# Patient Record
Sex: MALE | Employment: OTHER | URBAN - METROPOLITAN AREA
[De-identification: names, ages, dates, MRNs, and addresses within clinical notes are randomized per-mention and may not be internally consistent; named-entity substitution may affect disease eponyms.]

---

## 2021-12-30 ENCOUNTER — TRANSFERRED RECORDS (OUTPATIENT)
Dept: HEALTH INFORMATION MANAGEMENT | Facility: CLINIC | Age: 61
End: 2021-12-30

## 2021-12-30 ENCOUNTER — HOSPITAL ENCOUNTER (OUTPATIENT)
Dept: MRI IMAGING | Facility: OTHER | Age: 61
End: 2021-12-30
Attending: ORTHOPAEDIC SURGERY
Payer: MEDICARE

## 2021-12-30 DIAGNOSIS — Z98.890 S/P BONE GRAFT: ICD-10-CM

## 2021-12-30 DIAGNOSIS — M77.9 BONE SPUR: ICD-10-CM

## 2021-12-30 PROCEDURE — 72195 MRI PELVIS W/O DYE: CPT

## 2021-12-30 PROCEDURE — 73221 MRI JOINT UPR EXTREM W/O DYE: CPT | Mod: LT

## 2022-01-03 ENCOUNTER — TRANSFERRED RECORDS (OUTPATIENT)
Dept: HEALTH INFORMATION MANAGEMENT | Facility: CLINIC | Age: 62
End: 2022-01-03
Payer: COMMERCIAL

## 2022-01-12 ENCOUNTER — TRANSCRIBE ORDERS (OUTPATIENT)
Dept: OTHER | Age: 62
End: 2022-01-12
Payer: COMMERCIAL

## 2022-01-12 DIAGNOSIS — D16.9 OSTEOCHONDROMA: Primary | ICD-10-CM

## 2022-01-14 NOTE — TELEPHONE ENCOUNTER
RECORDS RECEIVED FROM: Osteochondroma/ Referral from Maurice Condon M.D. with [facility] for osteochondroma.   Imaging: MRI/ [employer] and [employer]/ ortho con-Patient would like link to go to [email or web address] for video visit please   DATE RECEIVED: Jan 18, 2022     NOTES STATUS DETAILS   OFFICE NOTE from referring provider Received  Maurice Condon MD          MEDICATION LIST Internal    MRI Received 12/30/21   CT SCAN In process-R    XRAYS (IMAGES & REPORTS) In process-R      01/17/22   9:50 AM   STAT REQUESTS SENT TO [facility] FOR IMAGES  Bette Puckett CMA

## 2022-01-18 ENCOUNTER — VIRTUAL VISIT (OUTPATIENT)
Dept: ORTHOPEDICS | Facility: CLINIC | Age: 62
End: 2022-01-18
Payer: COMMERCIAL

## 2022-01-18 ENCOUNTER — PRE VISIT (OUTPATIENT)
Dept: ORTHOPEDICS | Facility: CLINIC | Age: 62
End: 2022-01-18
Payer: COMMERCIAL

## 2022-01-18 ENCOUNTER — TELEPHONE (OUTPATIENT)
Dept: ORTHOPEDICS | Facility: CLINIC | Age: 62
End: 2022-01-18

## 2022-01-18 DIAGNOSIS — D16.9 OSTEOCHONDROMA: ICD-10-CM

## 2022-01-18 PROCEDURE — 99202 OFFICE O/P NEW SF 15 MIN: CPT | Mod: 95 | Performed by: ORTHOPAEDIC SURGERY

## 2022-01-18 RX ORDER — VITAMIN B COMPLEX
1 CAPSULE ORAL
COMMUNITY

## 2022-01-18 RX ORDER — ACETAMINOPHEN 325 MG/1
650 TABLET ORAL
COMMUNITY

## 2022-01-18 RX ORDER — CYCLOBENZAPRINE HCL 10 MG
10 TABLET ORAL 3 TIMES DAILY PRN
COMMUNITY

## 2022-01-18 RX ORDER — ACYCLOVIR 400 MG/1
400 TABLET ORAL
COMMUNITY
Start: 2021-11-01

## 2022-01-18 RX ORDER — MULTIPLE VITAMINS W/ MINERALS TAB 9MG-400MCG
1 TAB ORAL
COMMUNITY

## 2022-01-18 RX ORDER — IBUPROFEN 200 MG
400 TABLET ORAL
COMMUNITY

## 2022-01-18 RX ORDER — DESOXIMETASONE 2.5 MG/G
CREAM TOPICAL
COMMUNITY
Start: 2021-11-01

## 2022-01-18 NOTE — NURSING NOTE
Chief Complaint   Patient presents with     Consult     osteochondroma anterior superior right iliac crest referred by Dr. Maurice Condon      Video Visit     video via [employer]        61 year old  1960        Date/Surgery/Surgeon/Hospital:  1. Right hip bone harvest to left wrist in 1984 per pt          Pain Assessment  Patient Currently in Pain: Yes  0-10 Pain Scale: 6  Primary Pain Location: Hip (right hip area)            [id number] - [place], [place] - [address] [facility] [address]        Allergies   Allergen Reactions     Shellfish-Derived Products Anaphylaxis     Shrimp Anaphylaxis     Meloxicam Other (See Comments)     Other reaction(s): Edema  Swelling feet  Swelling feet  Swelling feet       Other Food Allergy      Crabs and lobsters      Dog Epithelium      Other reaction(s): Other (see comments)           Current Outpatient Medications   Medication     acetaminophen (TYLENOL) 325 MG tablet     acyclovir (ZOVIRAX) 400 MG tablet     Ascorbic Acid 250 MG CHEW     Cholecalciferol 10 MCG (400 UNIT) CAPS     cyclobenzaprine (FLEXERIL) 10 MG tablet     desoximetasone (TOPICORT) 0.25 % external cream     ibuprofen (ADVIL/MOTRIN) 200 MG tablet     multivitamin w/minerals (MULTI-VITAMIN) tablet     vitamin (B COMPLEX) capsule     Zinc 50 MG CAPS     No current facility-administered medications for this visit.

## 2022-01-18 NOTE — PROGRESS NOTES
Patient is a 61-year-old male who was seen today by video visit.    His chief complaint is a bony mass in the right pelvis.    He reports history dating back to around 1985 when he had bone graft harvested from his right iliac crest.  It appears as if he subsequently formed a bone  reaction to the graft harvest site and this has been a site of discomfort for him since 1997.  He reports the bony mass is causing a reduction in his mobility is causing pain is causing difficulties using a belt.  He describes a reduction in hip motion which he associates with the bony prominence.    I reviewed with neck that given the small size of the bony prominence I suspect it is causing some symptoms.  I cautioned him however that might not be causing all of his symptoms.  He understands this and would still like to consider removal of the bony prominence in the right ilium.    I reviewed his previous CT of the pelvis and MRIs of the pelvis and the left hip.  In summary these do show a bony prominence on the outer table of the ilium at the level of the anterior superior iliac spine.  This is narrow and sharp and certainly could represent a source of significant symptoms.    I reviewed with him the options of observation versus surgical excision.  I mentioned that surgical excision I think would help his symptoms but would likely not eliminate all of his symptoms.  I reviewed the risk of infection bony prominence recurrence, anesthetics to mention a few.  He understands these and would like to proceed with surgery.    Impression: Patient with a symptomatic bony prominence coming off of the anterior superior iliac spine region.    Plan: 1.  Surgical request form is completed.  2.  Jocelyn and Jose to follow-up with the patient to arrange surgery which will most likely need to be performed at the [facility] because of his BMI.    During this video visit in preparation I reviewed his imaging studies from 1:03 PM to 1:08 PM.  The  phone interaction and documentation was from 1:08 PM to 1:26 PM.  Total visit therefore was 23 minutes.

## 2022-01-18 NOTE — LETTER
1/18/2022         RE: Hebert Phelps  [address]  [place] [address]        Dear Colleague,    Thank you for referring your patient, Hebert Phelps, to the [facility] [place]. Please see a copy of my visit note below.    Patient is a 61-year-old male who was seen today by video visit.    His chief complaint is a bony mass in the right pelvis.    He reports history dating back to around 2014 when he had bone graft harvested from his right iliac crest.  It appears as if he subsequently formed a bone marrow reaction to the skin graft and this has been a site of discomfort for him since 2008.  He reports the bony mass is causing a reduction in his mobility is causing pain is causing difficulties using a belt.  He describes a reduction in hip motion which he associates with the bony prominence.    I reviewed with neck that given the small size of the bony prominence I suspect it is causing some symptoms.  I cautioned him however that might not be causing all of his symptoms.  He understands this and would still like to consider removal of the bony prominence in the right ilium.    I reviewed his previous CT of the pelvis and MRIs of the pelvis and the left hip.  In summary these do show a bony prominence on the outer table of the ilium at the level of the anterior superior iliac spine.  This is narrow and sharp and certainly could represent a source of significant symptoms.    I reviewed with him the options of observation versus surgical excision.  I mentioned that surgical excision I think would help his symptoms but would likely not eliminate all of his symptoms.  I reviewed the risk of infection bony prominence recurrence, anesthetics to mention a few.  He understands these and would like to proceed with surgery.    Impression: Patient with a symptomatic bony prominence coming off of the anterior superior iliac spine region.    Plan: 1.  Surgical request form is  completed.  2.  Jocelyn and Jose to follow-up with the patient to arrange surgery which will most likely need to be performed at the [facility] because of his BMI.    During this video visit in preparation I reviewed his imaging studies from 1:03 PM to 1:08 PM.  The phone interaction and documentation was from 1:08 PM to 1:26 PM.  Total visit therefore was 23 minutes.      Jorge L Sewell MD

## 2022-01-19 ENCOUNTER — TELEPHONE (OUTPATIENT)
Dept: ORTHOPEDICS | Facility: CLINIC | Age: 62
End: 2022-01-19
Payer: COMMERCIAL

## 2022-01-19 DIAGNOSIS — Z11.59 ENCOUNTER FOR SCREENING FOR OTHER VIRAL DISEASES: Primary | ICD-10-CM

## 2022-01-19 NOTE — TELEPHONE ENCOUNTER
RN called and reviewed surgery packet with patient.  RN also took down some corrections to dates that the patent would like changed in his notes from yesterday.  Patient will have Hieu drive him home from surgery and he will stay at his sister's home following surgery.  He is encouraged to review surgey packet and call us back with any questions that he may have.    He will have his H&P on  01-27-22 with PCP.  He has no other questions at this time.

## 2022-01-19 NOTE — TELEPHONE ENCOUNTER
Patient is scheduled for surgery with Dr. Sewell    Spoke with: Hebert    Date of Surgery: 1/31/2022    Location: [place]    Informed patient they will need an adult  yes    Pre op with Provider n/a    H&P: Scheduled with pcp    Pre-procedure COVID-19 Test: patient will do this locally    Additional imaging/appointments: n/a    Surgery packet: Mailed by RN     Additional comments: n/a

## 2022-01-30 ENCOUNTER — ANESTHESIA EVENT (OUTPATIENT)
Dept: SURGERY | Facility: CLINIC | Age: 62
End: 2022-01-30
Payer: COMMERCIAL

## 2022-01-30 ASSESSMENT — LIFESTYLE VARIABLES: TOBACCO_USE: 1

## 2022-01-31 ENCOUNTER — HOSPITAL ENCOUNTER (OUTPATIENT)
Facility: CLINIC | Age: 62
Discharge: HOME | End: 2022-01-31
Attending: ORTHOPAEDIC SURGERY | Admitting: ORTHOPAEDIC SURGERY
Payer: COMMERCIAL

## 2022-01-31 ENCOUNTER — ANESTHESIA (OUTPATIENT)
Dept: SURGERY | Facility: CLINIC | Age: 62
End: 2022-01-31
Payer: COMMERCIAL

## 2022-01-31 VITALS
TEMPERATURE: 98 F | RESPIRATION RATE: 12 BRPM | HEIGHT: 73 IN | HEART RATE: 62 BPM | DIASTOLIC BLOOD PRESSURE: 74 MMHG | SYSTOLIC BLOOD PRESSURE: 129 MMHG | BODY MASS INDEX: 40.91 KG/M2 | OXYGEN SATURATION: 95 % | WEIGHT: 308.64 LBS

## 2022-01-31 DIAGNOSIS — R22.41 HIP MASS, RIGHT: Primary | ICD-10-CM

## 2022-01-31 LAB — GLUCOSE BLDC GLUCOMTR-MCNC: 100 MG/DL (ref 70–99)

## 2022-01-31 PROCEDURE — 250N000025 HC SEVOFLURANE, PER MIN: Performed by: ORTHOPAEDIC SURGERY

## 2022-01-31 PROCEDURE — 710N000012 HC RECOVERY PHASE 2, PER MINUTE: Performed by: ORTHOPAEDIC SURGERY

## 2022-01-31 PROCEDURE — 88311 DECALCIFY TISSUE: CPT | Mod: TC | Performed by: ORTHOPAEDIC SURGERY

## 2022-01-31 PROCEDURE — 250N000011 HC RX IP 250 OP 636: Performed by: STUDENT IN AN ORGANIZED HEALTH CARE EDUCATION/TRAINING PROGRAM

## 2022-01-31 PROCEDURE — 250N000013 HC RX MED GY IP 250 OP 250 PS 637: Performed by: STUDENT IN AN ORGANIZED HEALTH CARE EDUCATION/TRAINING PROGRAM

## 2022-01-31 PROCEDURE — 250N000009 HC RX 250: Performed by: STUDENT IN AN ORGANIZED HEALTH CARE EDUCATION/TRAINING PROGRAM

## 2022-01-31 PROCEDURE — 999N000141 HC STATISTIC PRE-PROCEDURE NURSING ASSESSMENT: Performed by: ORTHOPAEDIC SURGERY

## 2022-01-31 PROCEDURE — 250N000011 HC RX IP 250 OP 636: Performed by: ORTHOPAEDIC SURGERY

## 2022-01-31 PROCEDURE — 370N000017 HC ANESTHESIA TECHNICAL FEE, PER MIN: Performed by: ORTHOPAEDIC SURGERY

## 2022-01-31 PROCEDURE — 360N000075 HC SURGERY LEVEL 2, PER MIN: Performed by: ORTHOPAEDIC SURGERY

## 2022-01-31 PROCEDURE — 272N000001 HC OR GENERAL SUPPLY STERILE: Performed by: ORTHOPAEDIC SURGERY

## 2022-01-31 PROCEDURE — 710N000010 HC RECOVERY PHASE 1, LEVEL 2, PER MIN: Performed by: ORTHOPAEDIC SURGERY

## 2022-01-31 PROCEDURE — 82962 GLUCOSE BLOOD TEST: CPT

## 2022-01-31 PROCEDURE — 258N000003 HC RX IP 258 OP 636: Performed by: STUDENT IN AN ORGANIZED HEALTH CARE EDUCATION/TRAINING PROGRAM

## 2022-01-31 PROCEDURE — 88305 TISSUE EXAM BY PATHOLOGIST: CPT | Mod: TC | Performed by: ORTHOPAEDIC SURGERY

## 2022-01-31 PROCEDURE — 27066 REMOVE HIP BONE LES DEEP: CPT | Mod: RT | Performed by: ORTHOPAEDIC SURGERY

## 2022-01-31 RX ORDER — SODIUM CHLORIDE, SODIUM LACTATE, POTASSIUM CHLORIDE, CALCIUM CHLORIDE 600; 310; 30; 20 MG/100ML; MG/100ML; MG/100ML; MG/100ML
INJECTION, SOLUTION INTRAVENOUS CONTINUOUS
Status: DISCONTINUED | OUTPATIENT
Start: 2022-01-31 | End: 2022-01-31 | Stop reason: HOSPADM

## 2022-01-31 RX ORDER — ONDANSETRON 2 MG/ML
INJECTION INTRAMUSCULAR; INTRAVENOUS PRN
Status: DISCONTINUED | OUTPATIENT
Start: 2022-01-31 | End: 2022-01-31

## 2022-01-31 RX ORDER — HALOPERIDOL 5 MG/ML
1 INJECTION INTRAMUSCULAR
Status: DISCONTINUED | OUTPATIENT
Start: 2022-01-31 | End: 2022-01-31 | Stop reason: HOSPADM

## 2022-01-31 RX ORDER — HYDROMORPHONE HCL IN WATER/PF 6 MG/30 ML
0.2 PATIENT CONTROLLED ANALGESIA SYRINGE INTRAVENOUS EVERY 5 MIN PRN
Status: DISCONTINUED | OUTPATIENT
Start: 2022-01-31 | End: 2022-01-31 | Stop reason: HOSPADM

## 2022-01-31 RX ORDER — OXYCODONE HYDROCHLORIDE 5 MG/1
5-10 TABLET ORAL EVERY 4 HOURS PRN
Qty: 20 TABLET | Refills: 0 | Status: SHIPPED | OUTPATIENT
Start: 2022-01-31 | End: 2022-02-03

## 2022-01-31 RX ORDER — ONDANSETRON 2 MG/ML
4 INJECTION INTRAMUSCULAR; INTRAVENOUS EVERY 30 MIN PRN
Status: DISCONTINUED | OUTPATIENT
Start: 2022-01-31 | End: 2022-01-31 | Stop reason: HOSPADM

## 2022-01-31 RX ORDER — FENTANYL CITRATE 50 UG/ML
25 INJECTION, SOLUTION INTRAMUSCULAR; INTRAVENOUS EVERY 5 MIN PRN
Status: DISCONTINUED | OUTPATIENT
Start: 2022-01-31 | End: 2022-01-31 | Stop reason: HOSPADM

## 2022-01-31 RX ORDER — SODIUM CHLORIDE, SODIUM LACTATE, POTASSIUM CHLORIDE, CALCIUM CHLORIDE 600; 310; 30; 20 MG/100ML; MG/100ML; MG/100ML; MG/100ML
INJECTION, SOLUTION INTRAVENOUS CONTINUOUS PRN
Status: DISCONTINUED | OUTPATIENT
Start: 2022-01-31 | End: 2022-01-31

## 2022-01-31 RX ORDER — CEFAZOLIN SODIUM IN 0.9 % NACL 3 G/100 ML
3 INTRAVENOUS SOLUTION, PIGGYBACK (ML) INTRAVENOUS
Status: COMPLETED | OUTPATIENT
Start: 2022-01-31 | End: 2022-01-31

## 2022-01-31 RX ORDER — FENTANYL CITRATE 50 UG/ML
25 INJECTION, SOLUTION INTRAMUSCULAR; INTRAVENOUS
Status: DISCONTINUED | OUTPATIENT
Start: 2022-01-31 | End: 2022-01-31 | Stop reason: HOSPADM

## 2022-01-31 RX ORDER — HYDRALAZINE HYDROCHLORIDE 20 MG/ML
2.5-5 INJECTION INTRAMUSCULAR; INTRAVENOUS EVERY 10 MIN PRN
Status: DISCONTINUED | OUTPATIENT
Start: 2022-01-31 | End: 2022-01-31 | Stop reason: HOSPADM

## 2022-01-31 RX ORDER — CEFAZOLIN SODIUM 2 G/100ML
2 INJECTION, SOLUTION INTRAVENOUS SEE ADMIN INSTRUCTIONS
Status: DISCONTINUED | OUTPATIENT
Start: 2022-01-31 | End: 2022-01-31 | Stop reason: HOSPADM

## 2022-01-31 RX ORDER — ACETAMINOPHEN 325 MG/1
975 TABLET ORAL ONCE
Status: COMPLETED | OUTPATIENT
Start: 2022-01-31 | End: 2022-01-31

## 2022-01-31 RX ORDER — CEFAZOLIN SODIUM 2 G/100ML
2 INJECTION, SOLUTION INTRAVENOUS
Status: DISCONTINUED | OUTPATIENT
Start: 2022-01-31 | End: 2022-01-31

## 2022-01-31 RX ORDER — ONDANSETRON 4 MG/1
4 TABLET, ORALLY DISINTEGRATING ORAL EVERY 30 MIN PRN
Status: DISCONTINUED | OUTPATIENT
Start: 2022-01-31 | End: 2022-01-31 | Stop reason: HOSPADM

## 2022-01-31 RX ORDER — FENTANYL CITRATE 50 UG/ML
INJECTION, SOLUTION INTRAMUSCULAR; INTRAVENOUS PRN
Status: DISCONTINUED | OUTPATIENT
Start: 2022-01-31 | End: 2022-01-31

## 2022-01-31 RX ORDER — OXYCODONE HYDROCHLORIDE 5 MG/1
5 TABLET ORAL EVERY 4 HOURS PRN
Status: DISCONTINUED | OUTPATIENT
Start: 2022-01-31 | End: 2022-01-31 | Stop reason: HOSPADM

## 2022-01-31 RX ORDER — LIDOCAINE 40 MG/G
CREAM TOPICAL
Status: DISCONTINUED | OUTPATIENT
Start: 2022-01-31 | End: 2022-01-31 | Stop reason: HOSPADM

## 2022-01-31 RX ORDER — DEXAMETHASONE SODIUM PHOSPHATE 4 MG/ML
INJECTION, SOLUTION INTRA-ARTICULAR; INTRALESIONAL; INTRAMUSCULAR; INTRAVENOUS; SOFT TISSUE PRN
Status: DISCONTINUED | OUTPATIENT
Start: 2022-01-31 | End: 2022-01-31

## 2022-01-31 RX ORDER — MEPERIDINE HYDROCHLORIDE 25 MG/ML
12.5 INJECTION INTRAMUSCULAR; INTRAVENOUS; SUBCUTANEOUS
Status: DISCONTINUED | OUTPATIENT
Start: 2022-01-31 | End: 2022-01-31 | Stop reason: HOSPADM

## 2022-01-31 RX ORDER — LIDOCAINE HYDROCHLORIDE 20 MG/ML
INJECTION, SOLUTION INFILTRATION; PERINEURAL PRN
Status: DISCONTINUED | OUTPATIENT
Start: 2022-01-31 | End: 2022-01-31

## 2022-01-31 RX ORDER — BUPIVACAINE HYDROCHLORIDE 2.5 MG/ML
INJECTION, SOLUTION INFILTRATION; PERINEURAL PRN
Status: DISCONTINUED | OUTPATIENT
Start: 2022-01-31 | End: 2022-01-31 | Stop reason: HOSPADM

## 2022-01-31 RX ORDER — PROPOFOL 10 MG/ML
INJECTION, EMULSION INTRAVENOUS PRN
Status: DISCONTINUED | OUTPATIENT
Start: 2022-01-31 | End: 2022-01-31

## 2022-01-31 RX ORDER — METOPROLOL TARTRATE 1 MG/ML
1-2 INJECTION, SOLUTION INTRAVENOUS EVERY 5 MIN PRN
Status: DISCONTINUED | OUTPATIENT
Start: 2022-01-31 | End: 2022-01-31 | Stop reason: HOSPADM

## 2022-01-31 RX ORDER — GABAPENTIN 300 MG/1
300 CAPSULE ORAL
Status: COMPLETED | OUTPATIENT
Start: 2022-01-31 | End: 2022-01-31

## 2022-01-31 RX ADMIN — LIDOCAINE HYDROCHLORIDE 100 MG: 20 INJECTION, SOLUTION INFILTRATION; PERINEURAL at 12:09

## 2022-01-31 RX ADMIN — DEXAMETHASONE SODIUM PHOSPHATE 4 MG: 4 INJECTION, SOLUTION INTRAMUSCULAR; INTRAVENOUS at 12:21

## 2022-01-31 RX ADMIN — FENTANYL CITRATE 50 MCG: 50 INJECTION, SOLUTION INTRAMUSCULAR; INTRAVENOUS at 12:09

## 2022-01-31 RX ADMIN — Medication 3 G: at 12:18

## 2022-01-31 RX ADMIN — GABAPENTIN 300 MG: 300 CAPSULE ORAL at 10:35

## 2022-01-31 RX ADMIN — ONDANSETRON 4 MG: 2 INJECTION INTRAMUSCULAR; INTRAVENOUS at 12:54

## 2022-01-31 RX ADMIN — OXYCODONE HYDROCHLORIDE 5 MG: 5 TABLET ORAL at 14:04

## 2022-01-31 RX ADMIN — FENTANYL CITRATE 50 MCG: 50 INJECTION, SOLUTION INTRAMUSCULAR; INTRAVENOUS at 12:30

## 2022-01-31 RX ADMIN — HYDROMORPHONE HYDROCHLORIDE 0.2 MG: 1 INJECTION, SOLUTION INTRAMUSCULAR; INTRAVENOUS; SUBCUTANEOUS at 13:46

## 2022-01-31 RX ADMIN — SODIUM CHLORIDE, POTASSIUM CHLORIDE, SODIUM LACTATE AND CALCIUM CHLORIDE: 600; 310; 30; 20 INJECTION, SOLUTION INTRAVENOUS at 12:04

## 2022-01-31 RX ADMIN — PROPOFOL 200 MG: 10 INJECTION, EMULSION INTRAVENOUS at 12:10

## 2022-01-31 RX ADMIN — ACETAMINOPHEN 975 MG: 325 TABLET, FILM COATED ORAL at 10:35

## 2022-01-31 RX ADMIN — ROCURONIUM BROMIDE 100 MG: 50 INJECTION, SOLUTION INTRAVENOUS at 12:13

## 2022-01-31 RX ADMIN — SUGAMMADEX 400 MG: 100 INJECTION, SOLUTION INTRAVENOUS at 13:09

## 2022-01-31 ASSESSMENT — MIFFLIN-ST. JEOR: SCORE: 2263.88

## 2022-01-31 NOTE — ANESTHESIA PREPROCEDURE EVALUATION
Anesthesia Pre-Procedure Evaluation    Patient: Hebert Phelps   MRN: [id number] : 1960        Preoperative Diagnosis: Osteochondroma [D16.9]    Procedure : Procedure(s):  Removal of right hip tumor          History reviewed. No pertinent past medical history.   Past Surgical History:   Procedure Laterality Date     COLONOSCOPY       ORTHOPEDIC SURGERY      Ligament repair right ankle, right hip      Allergies   Allergen Reactions     Shellfish-Derived Products Anaphylaxis     Shrimp Anaphylaxis     Meloxicam Other (See Comments)     Other reaction(s): Edema  Swelling feet  Swelling feet  Swelling feet       Other Food Allergy      Crabs and lobsters      Dog Epithelium      Other reaction(s): Other (see comments)      Social History     Tobacco Use     Smoking status: Never Smoker     Smokeless tobacco: Never Used   Substance Use Topics     Alcohol use: Not Currently      Wt Readings from Last 1 Encounters:   No data found for Wt        Anesthesia Evaluation   Pt has had prior anesthetic. Type: General.    No history of anesthetic complications       ROS/MED HX  ENT/Pulmonary:     (+) tobacco use, Past use,     Neurologic:  - neg neurologic ROS     Cardiovascular:     (+) Dyslipidemia -----    METS/Exercise Tolerance:     Hematologic:  - neg hematologic  ROS     Musculoskeletal: Comment: - Hx of neck and R shoulder pain  - Foraminal stenosis of cervical region    (+) arthritis,     GI/Hepatic:  - neg GI/hepatic ROS     Renal/Genitourinary:  - neg Renal ROS     Endo: Comment: - elevated glucose levels, A1c 6.1 (8/15/2018) - neg endo ROS     Psychiatric/Substance Use:  - neg psychiatric ROS     Infectious Disease:  - neg infectious disease ROS     Malignancy:       Other:               OUTSIDE LABS:  CBC: No results found for: WBC, HGB, HCT, PLT  BMP: No results found for: NA, POTASSIUM, CHLORIDE, CO2, BUN, CR, GLC  COAGS: No results found for: PTT, INR, FIBR  POC: No results found for: BGM, HCG,  HCGS  HEPATIC: No results found for: ALBUMIN, PROTTOTAL, ALT, AST, GGT, ALKPHOS, BILITOTAL, BILIDIRECT, TATIANA  OTHER: No results found for: PH, LACT, A1C, KIERAN, PHOS, MAG, LIPASE, AMYLASE, TSH, T4, T3, CRP, SED    Anesthesia Plan    ASA Status:  3      Anesthesia Type: General.     - Airway: ETT   Induction: Intravenous.   Maintenance: Inhalation.   Techniques and Equipment:     - Airway: Video-Laryngoscope     - Lines/Monitors: 2nd IV, BIS     Consents         - Patient is DNR/DNI Status: No         Postoperative Care    Pain management: Oral pain medications.   PONV prophylaxis: Ondansetron (or other 5HT-3), Dexamethasone or Solumedrol     Comments:                Nehemiah Bose MD

## 2022-01-31 NOTE — ANESTHESIA POSTPROCEDURE EVALUATION
Patient: Hebert Phelps    Procedure: Procedure(s):  Removal of right hip tumor       Diagnosis:Osteochondroma [D16.9]  Diagnosis Additional Information: No value filed.    Anesthesia Type:  General    Note:  Disposition: Outpatient   Postop Pain Control: Uneventful            Sign Out: Well controlled pain   PONV: No   Neuro/Psych: Uneventful            Sign Out: Acceptable/Baseline neuro status   Airway/Respiratory: Uneventful            Sign Out: Acceptable/Baseline resp. status   CV/Hemodynamics: Uneventful            Sign Out: Acceptable CV status   Other NRE: NONE   DID A NON-ROUTINE EVENT OCCUR? No    Event details/Postop Comments:  I personally evaluated the patient at bedside. No anesthesia-related complications noted. Patient is hemodynamically stable with adequate control of pain and nausea. Ready for discharge from PACU. All questions were answered.    Geovanny Xiao MD  Pediatric Staff Anesthesiologist  [facility]  Pager [phone]  Phone [phone]            Last vitals:  Vitals Value Taken Time   /71 01/31/22 1400   Temp     Pulse 58 01/31/22 1405   Resp 9 01/31/22 1405   SpO2 94 % 01/31/22 1406   Vitals shown include unvalidated device data.    Electronically Signed By: Geovanny Xiao MD  January 31, 2022  2:58 PM

## 2022-01-31 NOTE — OP NOTE
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    Estimated blood loss: 2 cc    Surgeons: Sree Sewell and Man Evans    Pathology submitted: Tumor right hip in formalin.    I met neck in the preoperative area.  We had previously met by remote visit.  On physical exam the tumor in the right ilium was palpable and tender.    The surgical site was marked with my initials and line of intended incision.  Consent was signed.  Preoperative briefing been performed.  He was taken the operating room received a general anesthetic in the supine position with a bump under the right hip the right anterior iliac region was prepped and draped sterilely.  Surgical timeout was performed.    A 3 inch incision was made over the palpable bony tumor.  Sharp dissection was taken down through skin and subcutaneous tissue.  The fascia overlying the tumor was incised.  Osteotomes were used to excise the proximal 1 inch x 3/4 inch by half inch bony tumor.  Wound was irrigated and closed with 3 layers.  Postoperative debrief was performed.    Postoperative plan: We will follow up with 1 postoperative visit which could be remote visit.

## 2022-01-31 NOTE — DISCHARGE INSTRUCTIONS
Same-Day Surgery   Adult Discharge Orders & Instructions     For 24 hours after surgery:  1. Get plenty of rest.  A responsible adult must stay with you for at least 24 hours after you leave the hospital.   2. Pain medication can slow your reflexes. Do not drive or use heavy equipment.  If you have weakness or tingling, don't drive or use heavy equipment until this feeling goes away.  3. Mixing alcohol and pain medication can cause dizziness and slow your breathing. It can even be fatal. Do not drink alcohol while taking pain medication.  4. Avoid strenuous or risky activities.  Ask for help when climbing stairs.   5. You may feel lightheaded.  If so, sit for a few minutes before standing.  Have someone help you get up.   6. If you have nausea (feel sick to your stomach), drink only clear liquids such as apple juice, ginger ale, broth or 7-Up.  Rest may also help.  Be sure to drink enough fluids.  Move to a regular diet as you feel able. Take pain medications with a small amount of solid food, such as toast or crackers, to avoid nausea.   7. A slight fever is normal. Call the doctor if your fever is over 100 F (37.7 C) (taken under the tongue) or lasts longer than 24 hours.  8. You may have a dry mouth, muscle aches, trouble sleeping or a sore throat.  These symptoms should go away after 24 hours.  9. Do not make important or legal decisions.   Pain Management:      1. Take pain medication (if prescribed) for pain as directed by your physician.        2. WARNING: If the pain medication you have been prescribed contains Tylenol  (acetaminophen), DO NOT take additional doses of Tylenol (acetaminophen).     Call your doctor for any of the followin.  Signs of infection (fever, growing tenderness at the surgery site, severe pain, a large amount of drainage or bleeding, foul-smelling drainage, redness, swelling).    2.  It has been over 8 to 10 hours since surgery and you are still not able to urinate (pee).    3.   Headache for over 24 hours.    4.  Numbness, tingling or weakness the day after surgery (if you had spinal anesthesia).  To contact a doctor, call Dr. Sewell, Orthopedic, [phone] or:      [phone] and ask for the Resident On Call for:          Orthopedics (answered 24 hours a day)      Emergency Department:  [facility]: [phone]  [facility]: [phone]

## 2022-01-31 NOTE — ANESTHESIA CARE TRANSFER NOTE
Patient: Hebert Phelps    Procedure: Procedure(s):  Removal of right hip tumor       Diagnosis: Osteochondroma [D16.9]  Diagnosis Additional Information: No value filed.    Anesthesia Type:   General     Note:    Oropharynx: oropharynx clear of all foreign objects  Level of Consciousness: awake  Oxygen Supplementation: face mask  Level of Supplemental Oxygen (L/min / FiO2): 6  Independent Airway: airway patency satisfactory and stable  Dentition: dentition unchanged  Vital Signs Stable: post-procedure vital signs reviewed and stable  Report to RN Given: handoff report given  Patient transferred to: PACU  Comments: VSS. Breathing spontaneously at a regular rate with adequate tidal volumes and maintaining O2 sats. Denies nausea or pain. No apparent complications from anesthesia.     Nehemiah Bose MD   Anesthesia CA-1    Handoff Report: Identifed the Patient, Identified the Reponsible Provider, Reviewed the pertinent medical history, Discussed the surgical course, Reviewed Intra-OP anesthesia mangement and issues during anesthesia, Set expectations for post-procedure period and Allowed opportunity for questions and acknowledgement of understanding      Vitals:  Vitals Value Taken Time   BP     Temp     Pulse 83 01/31/22 1330   Resp 20 01/31/22 1330   SpO2 98 % 01/31/22 1330   Vitals shown include unvalidated device data.    Electronically Signed By: Nehemiah Bose MD  January 31, 2022  1:31 PM

## 2022-01-31 NOTE — ANESTHESIA PROCEDURE NOTES
Airway       Patient location during procedure: OR       Procedure Start/Stop Times: 1/31/2022 12:16 PM  Staff -        Anesthesiologist:  Kane Tinsley MD       Resident/Fellow: Nehemiah Bose MD       Performed By: resident  Consent for Airway        Urgency: elective  Indications and Patient Condition       Indications for airway management: caroline-procedural         Mask difficulty assessment: 1 - vent by mask    Final Airway Details       Final airway type: endotracheal airway       Successful airway: ETT - single  Endotracheal Airway Details        ETT size (mm): 8.0       Cuffed: yes       Successful intubation technique: video laryngoscopy       VL Blade Size: MAC 4       Grade View of Cords: 1       Adjucts: stylet       Position: Right       Measured from: gums/teeth       Secured at (cm): 22       Bite block used: Soft    Post intubation assessment        Placement verified by: capnometry, equal breath sounds and chest rise        Number of attempts at approach: 1       Number of other approaches attempted: 0       Secured with: silk tape       Ease of procedure: easy       Dentition: Intact and Unchanged

## 2022-01-31 NOTE — BRIEF OP NOTE
Brief Operative Note  January 31, 2022  Hebert Phelps  1960  [id number]    Pre Op Diagnosis: R ilium benign tumor  Post Op Diagnosis: Same    Procedure Performed: R ilium tumor excision    Surgeon: Dr. Jorge L Sewell  Assistant: Marcus Evans, PGY4    Anesthesia: general  EBL: 2ml  Specimen: tumor sent for specimen  Findings: Please see detailed Op Note  Complications: None  Implants: See operative report      Post-Op Plan    Activity: Up with assist until independent  Weight bearing status: WBAT BLE  Bracing/Splinting: None  Imaging: none needed  Dressings: aquacel, okay to remove after POD5-7, ok to shower anytime  Diet: ADAT  Pain management: oxycodone ordered  Follow-up: as scheduled  Disposition: Okay for discharge from PACU when meeting same-day surgery criteria.    Marcus Evans MD  Orthopaedic Surgery, PGY4  Pager: [phone]

## 2022-02-04 LAB
PATH REPORT.COMMENTS IMP SPEC: NORMAL
PATH REPORT.COMMENTS IMP SPEC: NORMAL
PATH REPORT.FINAL DX SPEC: NORMAL
PATH REPORT.GROSS SPEC: NORMAL
PATH REPORT.MICROSCOPIC SPEC OTHER STN: NORMAL
PATH REPORT.RELEVANT HX SPEC: NORMAL
PHOTO IMAGE: NORMAL

## 2022-02-04 PROCEDURE — 88311 DECALCIFY TISSUE: CPT | Mod: 26 | Performed by: PATHOLOGY

## 2022-02-04 PROCEDURE — 88305 TISSUE EXAM BY PATHOLOGIST: CPT | Mod: 26 | Performed by: PATHOLOGY

## 2022-02-06 ENCOUNTER — HEALTH MAINTENANCE LETTER (OUTPATIENT)
Age: 62
End: 2022-02-06

## 2022-02-15 ENCOUNTER — VIRTUAL VISIT (OUTPATIENT)
Dept: ORTHOPEDICS | Facility: CLINIC | Age: 62
End: 2022-02-15
Payer: COMMERCIAL

## 2022-02-15 DIAGNOSIS — D16.9 OSTEOCHONDROMA: Primary | ICD-10-CM

## 2022-02-15 PROCEDURE — 99024 POSTOP FOLLOW-UP VISIT: CPT | Performed by: ORTHOPAEDIC SURGERY

## 2022-02-15 NOTE — LETTER
2/15/2022         RE: Hebert Phelps  [address]  [place] [address]        Dear Colleague,    Thank you for referring your patient, Hebert Phelps, to the [facility] [place]. Please see a copy of my visit note below.    Diagnosis: Benign bony prominence right iliac crest.    Treatment: Surgical excision    Patient was interviewed by video visit.  He reports he is doing well since surgery his first few days were somewhat difficult he describes ecchymosis around the right hip wound.  This has completely resolved.  He also reports some numbness or decreased sensitivity of his skin around the area just distal to the surgical site.  I reassured him that I think this will improve.    He reports his incision is dry and not draining.  It was not visualized during the visit as he was at work.    I reviewed the pathology with him which is that of benign bone formation.  This was called an osteochondroma.  I am not sure that is as accurate as reactive bone following iliac crest bone harvest.    Impression: Overall doing very well.  Pleased with his outcome and care.    Plan: Follow-up as needed.    The video visit began at 2:03 PM and ended at 2:13 PM    Jorge L Sewell MD

## 2022-02-15 NOTE — PROGRESS NOTES
Diagnosis: Benign bony prominence right iliac crest.    Treatment: Surgical excision    Patient was interviewed by video visit.  He reports he is doing well since surgery his first few days were somewhat difficult he describes ecchymosis around the right hip wound.  This has completely resolved.  He also reports some numbness or decreased sensitivity of his skin around the area just distal to the surgical site.  I reassured him that I think this will improve.    He reports his incision is dry and not draining.  It was not visualized during the visit as he was at work.    I reviewed the pathology with him which is that of benign bone formation.  This was called an osteochondroma.  I am not sure that is as accurate as reactive bone following iliac crest bone harvest.    Impression: Overall doing very well.  Pleased with his outcome and care.    Plan: Follow-up as needed.    The video visit began at 2:03 PM and ended at 2:13 PM

## 2022-02-15 NOTE — NURSING NOTE
Chief Complaint   Patient presents with     Surgical Followup     2 wk post-op right hip tumor removal DOS 1/31/22 // incision looks good withot drainage per pt      Video Visit     video visit via mychart        61 year old  1960         Pain Assessment  Patient Currently in Pain: Denies (no pain now // can get up to 4 with pressure)            [id number] - [place], [place] - [address] AT [facility] [address]        Allergies   Allergen Reactions     Shellfish-Derived Products Anaphylaxis     Shrimp Anaphylaxis     Meloxicam Other (See Comments)     Other reaction(s): Edema  Swelling feet  Swelling feet  Swelling feet       Other Food Allergy      Crabs and lobsters      Dog Epithelium      Other reaction(s): Other (see comments)           Current Outpatient Medications   Medication     acetaminophen (TYLENOL) 325 MG tablet     acyclovir (ZOVIRAX) 400 MG tablet     Ascorbic Acid 250 MG CHEW     Cholecalciferol 10 MCG (400 UNIT) CAPS     cyclobenzaprine (FLEXERIL) 10 MG tablet     desoximetasone (TOPICORT) 0.25 % external cream     ibuprofen (ADVIL/MOTRIN) 200 MG tablet     multivitamin w/minerals (MULTI-VITAMIN) tablet     vitamin (B COMPLEX) capsule     Zinc 50 MG CAPS     No current facility-administered medications for this visit.

## 2022-10-03 ENCOUNTER — HEALTH MAINTENANCE LETTER (OUTPATIENT)
Age: 62
End: 2022-10-03

## 2023-02-12 ENCOUNTER — HEALTH MAINTENANCE LETTER (OUTPATIENT)
Age: 63
End: 2023-02-12

## 2024-03-09 ENCOUNTER — HEALTH MAINTENANCE LETTER (OUTPATIENT)
Age: 64
End: 2024-03-09

## (undated) DEVICE — ESU GROUND PAD UNIVERSAL W/O CORD

## (undated) DEVICE — SU VICRYL 2-0 CT-2 27" UND J269H

## (undated) DEVICE — PREP CHLORAPREP 26ML TINTED HI-LITE ORANGE 930815

## (undated) DEVICE — SU VICRYL 0 CT-2 27" J334H

## (undated) DEVICE — DRSG AQUACEL AG HYDROFIBER 3.5X6" 422604

## (undated) DEVICE — LINEN GOWN X4 5410

## (undated) DEVICE — SUCTION MANIFOLD NEPTUNE 2 SYS 4 PORT 0702-020-000

## (undated) DEVICE — STRAP KNEE/BODY 31143004

## (undated) DEVICE — GLOVE PROTEXIS BLUE W/NEU-THERA 7.5  2D73EB75

## (undated) DEVICE — GLOVE PROTEXIS W/NEU-THERA 7.5  2D73TE75

## (undated) DEVICE — GOWN XLG DISP 9545

## (undated) DEVICE — Device

## (undated) DEVICE — SOL NACL 0.9% IRRIG 1000ML BOTTLE 2F7124

## (undated) DEVICE — GLOVE PROTEXIS W/NEU-THERA 7.0  2D73TE70

## (undated) DEVICE — SU MONOCRYL 4-0 PS-2 18" UND Y496G

## (undated) DEVICE — GLOVE PROTEXIS BLUE W/NEU-THERA 8.0  2D73EB80

## (undated) DEVICE — DRAPE IOBAN INCISE 23X17" 6650EZ

## (undated) DEVICE — PACK UNIVERSAL SPLIT 29131

## (undated) DEVICE — SUCTION TIP YANKAUER STR K87

## (undated) DEVICE — LINEN BACK PACK 5440

## (undated) RX ORDER — FENTANYL CITRATE 50 UG/ML
INJECTION, SOLUTION INTRAMUSCULAR; INTRAVENOUS
Status: DISPENSED
Start: 2022-01-31

## (undated) RX ORDER — ACETAMINOPHEN 325 MG/1
TABLET ORAL
Status: DISPENSED
Start: 2022-01-31

## (undated) RX ORDER — HYDROMORPHONE HYDROCHLORIDE 1 MG/ML
INJECTION, SOLUTION INTRAMUSCULAR; INTRAVENOUS; SUBCUTANEOUS
Status: DISPENSED
Start: 2022-01-31

## (undated) RX ORDER — OXYCODONE HYDROCHLORIDE 5 MG/1
TABLET ORAL
Status: DISPENSED
Start: 2022-01-31

## (undated) RX ORDER — GABAPENTIN 300 MG/1
CAPSULE ORAL
Status: DISPENSED
Start: 2022-01-31

## (undated) RX ORDER — CEFAZOLIN SODIUM IN 0.9 % NACL 3 G/100 ML
INTRAVENOUS SOLUTION, PIGGYBACK (ML) INTRAVENOUS
Status: DISPENSED
Start: 2022-01-31

## (undated) RX ORDER — LABETALOL HYDROCHLORIDE 5 MG/ML
INJECTION, SOLUTION INTRAVENOUS
Status: DISPENSED
Start: 2022-01-31